# Patient Record
Sex: FEMALE | Race: WHITE
[De-identification: names, ages, dates, MRNs, and addresses within clinical notes are randomized per-mention and may not be internally consistent; named-entity substitution may affect disease eponyms.]

---

## 2017-09-12 ENCOUNTER — HOSPITAL ENCOUNTER (OUTPATIENT)
Dept: HOSPITAL 45 - C.LABPBG | Age: 77
Discharge: HOME | End: 2017-09-12
Attending: INTERNAL MEDICINE
Payer: COMMERCIAL

## 2017-09-12 DIAGNOSIS — E03.9: Primary | ICD-10-CM

## 2017-09-12 DIAGNOSIS — R53.83: ICD-10-CM

## 2017-09-12 LAB
ALBUMIN/GLOB SERPL: 0.9 {RATIO} (ref 0.9–2)
ALP SERPL-CCNC: 56 U/L (ref 45–117)
ALT SERPL-CCNC: 21 U/L (ref 12–78)
ANION GAP SERPL CALC-SCNC: 4 MMOL/L (ref 3–11)
AST SERPL-CCNC: 19 U/L (ref 15–37)
BASOPHILS # BLD: 0.02 K/UL (ref 0–0.2)
BASOPHILS NFR BLD: 0.4 %
BUN SERPL-MCNC: 10 MG/DL (ref 7–18)
BUN/CREAT SERPL: 14.3 (ref 10–20)
CALCIUM SERPL-MCNC: 9.2 MG/DL (ref 8.5–10.1)
CHLORIDE SERPL-SCNC: 108 MMOL/L (ref 98–107)
CO2 SERPL-SCNC: 28 MMOL/L (ref 21–32)
COMPLETE: YES
CREAT SERPL-MCNC: 0.67 MG/DL (ref 0.6–1.2)
EOSINOPHIL NFR BLD AUTO: 258 K/UL (ref 130–400)
GLOBULIN SER-MCNC: 3.5 GM/DL (ref 2.5–4)
GLUCOSE SERPL-MCNC: 82 MG/DL (ref 70–99)
HCT VFR BLD CALC: 42.1 % (ref 37–47)
IG%: 0.2 %
IMM GRANULOCYTES NFR BLD AUTO: 31.1 %
LYME DISEASE AB IGG: (no result)
LYME DISEASE AB IGM: (no result)
LYMPHOCYTES # BLD: 1.65 K/UL (ref 1.2–3.4)
MCH RBC QN AUTO: 32.3 PG (ref 25–34)
MCHC RBC AUTO-ENTMCNC: 33 G/DL (ref 32–36)
MCV RBC AUTO: 97.9 FL (ref 80–100)
MONOCYTES NFR BLD: 9.4 %
NEUTROPHILS # BLD AUTO: 3 %
NEUTROPHILS NFR BLD AUTO: 55.9 %
PMV BLD AUTO: 10.9 FL (ref 7.4–10.4)
POTASSIUM SERPL-SCNC: 4 MMOL/L (ref 3.5–5.1)
RBC # BLD AUTO: 4.3 M/UL (ref 4.2–5.4)
SODIUM SERPL-SCNC: 140 MMOL/L (ref 136–145)
TSH SERPL-ACNC: 2.87 UIU/ML (ref 0.3–4.5)
WBC # BLD AUTO: 5.3 K/UL (ref 4.8–10.8)

## 2017-10-10 NOTE — CODING QUERY MEDICAL NECESSITY
SUPPORTING DIAGNOSIS NEEDED





A supporting diagnosis is required for the test/procedure performed on this patient in 
order for us to be reimbursed by the patient's insurance. Please provide a supporting 
diagnosis for the following test/procedure listed below next to the test name along with 
your signature. 



*If there is no additional diagnosis for this patient that would support the following 
test/procedure please document that below next to the test/procedure.



Test(s)/Procedure(s) that require a supporting diagnosis:







* VITAMIN D, 25-HYDROXY                          DIAGNOSIS:







Provider Signature:  ______________________________  Date:  _______



Thank you  

Cassie Urban

[facility] [employer] [facility]

Phone:  [phone]

Fax:  [phone]



Once completed, please kindly fax back to [phone]



For questions please call [phone]

## 2018-03-16 ENCOUNTER — HOSPITAL ENCOUNTER (OUTPATIENT)
Dept: HOSPITAL 45 - C.LABPBG | Age: 78
Discharge: HOME | End: 2018-03-16
Attending: INTERNAL MEDICINE
Payer: COMMERCIAL

## 2018-03-16 DIAGNOSIS — R10.9: ICD-10-CM

## 2018-03-16 DIAGNOSIS — M19.90: ICD-10-CM

## 2018-03-16 DIAGNOSIS — E78.5: ICD-10-CM

## 2018-03-16 DIAGNOSIS — E03.9: Primary | ICD-10-CM

## 2018-03-16 LAB
BASOPHILS # BLD: 0.03 K/UL (ref 0–0.2)
BASOPHILS NFR BLD: 0.5 %
BUN SERPL-MCNC: 13 MG/DL (ref 7–18)
CALCIUM SERPL-MCNC: 8.6 MG/DL (ref 8.5–10.1)
CO2 SERPL-SCNC: 27 MMOL/L (ref 21–32)
CREAT SERPL-MCNC: 0.77 MG/DL (ref 0.6–1.2)
EOS ABS #: 0.12 K/UL (ref 0–0.5)
EOSINOPHIL NFR BLD AUTO: 270 K/UL (ref 130–400)
GLUCOSE SERPL-MCNC: 107 MG/DL (ref 70–99)
HCT VFR BLD CALC: 40.2 % (ref 37–47)
HGB BLD-MCNC: 13.6 G/DL (ref 12–16)
IG#: 0 K/UL (ref 0–0.02)
IMM GRANULOCYTES NFR BLD AUTO: 30.4 %
KETONES UR QL STRIP: 79 MG/DL
LYMPHOCYTES # BLD: 2 K/UL (ref 1.2–3.4)
MCH RBC QN AUTO: 32.9 PG (ref 25–34)
MCHC RBC AUTO-ENTMCNC: 33.8 G/DL (ref 32–36)
MCV RBC AUTO: 97.3 FL (ref 80–100)
MONO ABS #: 0.51 K/UL (ref 0.11–0.59)
MONOCYTES NFR BLD: 7.8 %
NEUT ABS #: 3.92 K/UL (ref 1.4–6.5)
NEUTROPHILS # BLD AUTO: 1.8 %
NEUTROPHILS NFR BLD AUTO: 59.5 %
PH UR: 187 MG/DL (ref 0–200)
PMV BLD AUTO: 11.1 FL (ref 7.4–10.4)
POTASSIUM SERPL-SCNC: 4.1 MMOL/L (ref 3.5–5.1)
RED CELL DISTRIBUTION WIDTH CV: 13 % (ref 11.5–14.5)
RED CELL DISTRIBUTION WIDTH SD: 45.9 FL (ref 36.4–46.3)
SODIUM SERPL-SCNC: 137 MMOL/L (ref 136–145)
WBC # BLD AUTO: 6.58 K/UL (ref 4.8–10.8)

## 2018-08-23 ENCOUNTER — HOSPITAL ENCOUNTER (OUTPATIENT)
Dept: HOSPITAL 45 - C.LABSPEC | Age: 78
Discharge: HOME | End: 2018-08-23
Attending: NURSE PRACTITIONER
Payer: COMMERCIAL

## 2018-08-23 DIAGNOSIS — J02.9: Primary | ICD-10-CM
